# Patient Record
Sex: FEMALE | Race: BLACK OR AFRICAN AMERICAN | NOT HISPANIC OR LATINO | Employment: STUDENT | URBAN - METROPOLITAN AREA
[De-identification: names, ages, dates, MRNs, and addresses within clinical notes are randomized per-mention and may not be internally consistent; named-entity substitution may affect disease eponyms.]

---
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
## 2025-03-13 ENCOUNTER — HOSPITAL ENCOUNTER (OUTPATIENT)
Dept: CARDIOLOGY | Facility: HOSPITAL | Age: 20
Discharge: HOME | End: 2025-03-13

## 2025-03-13 ENCOUNTER — HOSPITAL ENCOUNTER (EMERGENCY)
Facility: HOSPITAL | Age: 20
Discharge: HOME | End: 2025-03-13

## 2025-03-13 ENCOUNTER — APPOINTMENT (OUTPATIENT)
Dept: RADIOLOGY | Facility: HOSPITAL | Age: 20
End: 2025-03-13

## 2025-03-13 VITALS
SYSTOLIC BLOOD PRESSURE: 125 MMHG | DIASTOLIC BLOOD PRESSURE: 83 MMHG | HEART RATE: 74 BPM | OXYGEN SATURATION: 98 % | BODY MASS INDEX: 24.91 KG/M2 | WEIGHT: 155 LBS | TEMPERATURE: 97.9 F | HEIGHT: 66 IN | RESPIRATION RATE: 16 BRPM

## 2025-03-13 DIAGNOSIS — S29.012A UPPER BACK STRAIN, INITIAL ENCOUNTER: Primary | ICD-10-CM

## 2025-03-13 PROCEDURE — 2500000001 HC RX 250 WO HCPCS SELF ADMINISTERED DRUGS (ALT 637 FOR MEDICARE OP): Performed by: PHYSICIAN ASSISTANT

## 2025-03-13 PROCEDURE — 93005 ELECTROCARDIOGRAM TRACING: CPT

## 2025-03-13 PROCEDURE — 99284 EMERGENCY DEPT VISIT MOD MDM: CPT | Performed by: PHYSICIAN ASSISTANT

## 2025-03-13 PROCEDURE — 99282 EMERGENCY DEPT VISIT SF MDM: CPT

## 2025-03-13 RX ORDER — CYCLOBENZAPRINE HCL 10 MG
10 TABLET ORAL 3 TIMES DAILY PRN
Qty: 9 TABLET | Refills: 0 | Status: SHIPPED | OUTPATIENT
Start: 2025-03-13 | End: 2025-03-16

## 2025-03-13 RX ORDER — ACETAMINOPHEN 325 MG/1
650 TABLET ORAL EVERY 6 HOURS PRN
Qty: 20 TABLET | Refills: 0 | Status: SHIPPED | OUTPATIENT
Start: 2025-03-13 | End: 2025-03-18

## 2025-03-13 RX ORDER — ACETAMINOPHEN 325 MG/1
975 TABLET ORAL ONCE
Status: COMPLETED | OUTPATIENT
Start: 2025-03-13 | End: 2025-03-13

## 2025-03-13 RX ORDER — NAPROXEN 500 MG/1
500 TABLET ORAL
Qty: 14 TABLET | Refills: 0 | Status: SHIPPED | OUTPATIENT
Start: 2025-03-13 | End: 2025-03-20

## 2025-03-13 RX ADMIN — ACETAMINOPHEN 975 MG: 325 TABLET ORAL at 21:03

## 2025-03-13 ASSESSMENT — PAIN DESCRIPTION - LOCATION: LOCATION: BACK

## 2025-03-13 ASSESSMENT — PAIN DESCRIPTION - ONSET: ONSET: SUDDEN

## 2025-03-13 ASSESSMENT — PAIN DESCRIPTION - FREQUENCY: FREQUENCY: CONSTANT/CONTINUOUS

## 2025-03-13 ASSESSMENT — COLUMBIA-SUICIDE SEVERITY RATING SCALE - C-SSRS
6. HAVE YOU EVER DONE ANYTHING, STARTED TO DO ANYTHING, OR PREPARED TO DO ANYTHING TO END YOUR LIFE?: NO
1. IN THE PAST MONTH, HAVE YOU WISHED YOU WERE DEAD OR WISHED YOU COULD GO TO SLEEP AND NOT WAKE UP?: NO
2. HAVE YOU ACTUALLY HAD ANY THOUGHTS OF KILLING YOURSELF?: NO

## 2025-03-13 ASSESSMENT — PAIN DESCRIPTION - DESCRIPTORS: DESCRIPTORS: PRESSURE

## 2025-03-13 ASSESSMENT — PAIN - FUNCTIONAL ASSESSMENT: PAIN_FUNCTIONAL_ASSESSMENT: 0-10

## 2025-03-13 ASSESSMENT — PAIN DESCRIPTION - PROGRESSION: CLINICAL_PROGRESSION: GRADUALLY WORSENING

## 2025-03-13 ASSESSMENT — PAIN DESCRIPTION - PAIN TYPE: TYPE: ACUTE PAIN

## 2025-03-13 ASSESSMENT — PAIN DESCRIPTION - ORIENTATION: ORIENTATION: MID;UPPER

## 2025-03-13 ASSESSMENT — PAIN DESCRIPTION - DIRECTION: RADIATING_TOWARDS: BOTH SHOULDERS

## 2025-03-13 ASSESSMENT — PAIN SCALES - GENERAL: PAINLEVEL_OUTOF10: 8

## 2025-03-14 NOTE — ED PROVIDER NOTES
"Emergency Department Encounter  [facility]GENCY [facility]ICINE    Patient: Chanel Cabrera  MRN: [id number]  : 2005  Date of Evaluation: 3/13/2025  ED Provider: Dimple Guadarrama PA-C        History of Present Illness     This is a 19-year-old female with past medical history of seizures who presents to the ED with diffuse upper back pain for the past 2 weeks.  She describes it as a sharp pain that is worse with laying flat, movement and lifting.  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  She does a large amount of heavy lifting for her job.  She denies any specific injury or trauma.  She denies any associated paresthesias, weakness or areas of decree sensation.  She denies any cough.  She denies any episodes of bowel or bladder incontinence.  She has not take anything at home for her symptoms.  She denies this ever happening previously.      History provided by:  Patient   used: No             Visit Vitals  /83   Pulse 74   Temp 36.6 °C (97.9 °F) (Temporal)   Resp 16   Ht 1.676 m (5' 6\")   Wt 70.3 kg (155 lb)   LMP 2025 (Approximate)   SpO2 98%   BMI 25.02 kg/m²   OB Status Having periods   Smoking Status Never   BSA 1.81 m²          Physical Exam       Triage vitals:  T 36.6 °C (97.9 °F)  HR 87  BP (!) 140/95  RR 16  O2 100 % None (Room air)    Physical Exam     Physical exam:   General: Vitals noted, no distress. Afebrile.   EENT:  Hearing grossly intact. Normal phonation. MMM. Airway patient. PERRL. EOMI.   Neck: No midline tenderness or paraspinal tenderness. FROM.   Cardiac: Regular, rate, rhythm. Normal S1 and S2.  No murmurs, gallops, rubs.   Pulmonary: Good air exchange. Lungs clear bilaterally. No wheezes, rhonchi, rales. No accessory muscle use.   Abdomen: Soft, nonsurgical. Nontender. No peritoneal signs.   Back: No CVA tenderness.no obvious deformity.  Diffuse midline and bilateral paraspinal thoracic tenderness palpation without any obvious " deformity or step-off.  Extremities: No peripheral edema.  Full range of motion. Moves all extremities freely. No tenderness throughout extremities.   Skin: No rash. Warm and Dry.   Neuro: No focal neurologic deficits. CN 2-12 grossly intact. Sensation equal bilaterally. No weakness.       Results       Labs Reviewed - No data to display    No orders to display         Medical Decision Making & ED Course         ED Course & MDM     Medical Decision Making  This is a 19-year-old healthy female who presents to the ED with upper back pain for the past 2 weeks.  Vital stable upon arrival to the ED.  On examination lungs clear to auscultation.  No audible cardiac murmurs.  She did have midline and bilateral paraspinal thoracic tenderness palpation without any obvious deformity or step-off.  She is neurologically intact without any deficits.  She denied any chest pain or shortness of breath.  Patient medicated with Tylenol here in the ED for her pain.  I did discuss imaging of her thoracic spine due to her midline tenderness.  I do have a low clinical suspicion for fracture based on patient's exam and description of her symptoms.  Patient elected to not have an x-ray here in the ED and to be discharged home to follow-up with her primary care provider if her symptoms persist.  Based on the patient's exam and description of her symptoms high suspicion for thoracic strain being the source of her pain today.  She was written prescriptions for naproxen, Flexeril, and Tylenol for her symptoms at home.  she was given signs and symptoms that she should return to the emergency department with.  She had no further questions at this time and was discharged from emergency department in stable condition.         Diagnoses as of 03/13/25 2238   Upper back strain, initial encounter           Disposition   As a result of the work-up, the patient was discharged home.  she was informed of her diagnosis and instructed to come back with any  concerns or worsening of condition.  she and was agreeable to the plan as discussed above.  she was given the opportunity to ask questions.  All of the patient's questions were answered.    Procedures     Patient was seen independently    Procedures    Dimple Guadarrama PA-C  Emergency Medicine      Dimple Guadarrama PA-C  03/13/25 3937

## 2025-03-15 LAB
ATRIAL RATE: 84 BPM
P OFFSET: 186 MS
P ONSET: 166 MS
Q ONSET: 224 MS
QRS COUNT: 13 BEATS
QRS DURATION: 66 MS
QT INTERVAL: 386 MS
QTC CALCULATION(BAZETT): 448 MS
QTC FREDERICIA: 426 MS
R AXIS: 39 DEGREES
T AXIS: 45 DEGREES
T OFFSET: 417 MS
VENTRICULAR RATE: 81 BPM

## 2025-03-18 LAB
ATRIAL RATE: 84 BPM
P OFFSET: 186 MS
P ONSET: 166 MS
PR INTERVAL: 130 MS
Q ONSET: 224 MS
QRS COUNT: 13 BEATS
QRS DURATION: 66 MS
QT INTERVAL: 386 MS
QTC CALCULATION(BAZETT): 448 MS
QTC FREDERICIA: 426 MS
R AXIS: 39 DEGREES
T AXIS: 45 DEGREES
T OFFSET: 417 MS
VENTRICULAR RATE: 81 BPM